# Patient Record
Sex: MALE | Race: BLACK OR AFRICAN AMERICAN | URBAN - METROPOLITAN AREA
[De-identification: names, ages, dates, MRNs, and addresses within clinical notes are randomized per-mention and may not be internally consistent; named-entity substitution may affect disease eponyms.]

---

## 2019-01-01 ENCOUNTER — TELEPHONE (OUTPATIENT)
Dept: FAMILY MEDICINE CLINIC | Age: 49
End: 2019-01-01

## 2019-01-01 ENCOUNTER — OFFICE VISIT (OUTPATIENT)
Dept: FAMILY MEDICINE CLINIC | Age: 49
End: 2019-01-01

## 2019-01-01 ENCOUNTER — OFFICE VISIT (OUTPATIENT)
Dept: CARDIOLOGY CLINIC | Age: 49
End: 2019-01-01

## 2019-01-01 VITALS
HEIGHT: 69 IN | TEMPERATURE: 98.3 F | SYSTOLIC BLOOD PRESSURE: 160 MMHG | WEIGHT: 301 LBS | OXYGEN SATURATION: 96 % | DIASTOLIC BLOOD PRESSURE: 76 MMHG | RESPIRATION RATE: 16 BRPM | BODY MASS INDEX: 44.58 KG/M2 | HEART RATE: 88 BPM

## 2019-01-01 VITALS
DIASTOLIC BLOOD PRESSURE: 70 MMHG | HEIGHT: 69 IN | SYSTOLIC BLOOD PRESSURE: 140 MMHG | BODY MASS INDEX: 43.69 KG/M2 | OXYGEN SATURATION: 98 % | HEART RATE: 58 BPM | WEIGHT: 295 LBS

## 2019-01-01 VITALS
RESPIRATION RATE: 16 BRPM | HEIGHT: 69 IN | SYSTOLIC BLOOD PRESSURE: 149 MMHG | DIASTOLIC BLOOD PRESSURE: 68 MMHG | HEART RATE: 63 BPM | BODY MASS INDEX: 43.84 KG/M2 | OXYGEN SATURATION: 97 % | TEMPERATURE: 98.1 F | WEIGHT: 296 LBS

## 2019-01-01 VITALS
TEMPERATURE: 98.4 F | DIASTOLIC BLOOD PRESSURE: 76 MMHG | HEIGHT: 69 IN | SYSTOLIC BLOOD PRESSURE: 173 MMHG | WEIGHT: 293 LBS | OXYGEN SATURATION: 98 % | HEART RATE: 74 BPM | BODY MASS INDEX: 43.4 KG/M2 | RESPIRATION RATE: 17 BRPM

## 2019-01-01 DIAGNOSIS — Z79.4 CONTROLLED TYPE 2 DIABETES MELLITUS WITH HYPERGLYCEMIA, WITH LONG-TERM CURRENT USE OF INSULIN (HCC): ICD-10-CM

## 2019-01-01 DIAGNOSIS — Z79.4 TYPE 2 DIABETES MELLITUS WITH HYPERGLYCEMIA, WITH LONG-TERM CURRENT USE OF INSULIN (HCC): Primary | ICD-10-CM

## 2019-01-01 DIAGNOSIS — E66.01 MORBID OBESITY WITH BMI OF 40.0-44.9, ADULT (HCC): ICD-10-CM

## 2019-01-01 DIAGNOSIS — H25.13 NUCLEAR SCLEROTIC CATARACT OF BOTH EYES: ICD-10-CM

## 2019-01-01 DIAGNOSIS — E11.65 TYPE 2 DIABETES MELLITUS WITH HYPERGLYCEMIA, WITH LONG-TERM CURRENT USE OF INSULIN (HCC): ICD-10-CM

## 2019-01-01 DIAGNOSIS — R06.02 SOB (SHORTNESS OF BREATH) ON EXERTION: ICD-10-CM

## 2019-01-01 DIAGNOSIS — Z79.4 TYPE 2 DIABETES MELLITUS WITH HYPERGLYCEMIA, WITH LONG-TERM CURRENT USE OF INSULIN (HCC): ICD-10-CM

## 2019-01-01 DIAGNOSIS — I50.9 CONGESTIVE HEART FAILURE, UNSPECIFIED HF CHRONICITY, UNSPECIFIED HEART FAILURE TYPE (HCC): Primary | ICD-10-CM

## 2019-01-01 DIAGNOSIS — R94.31 ABNORMAL EKG: ICD-10-CM

## 2019-01-01 DIAGNOSIS — H31.001 CHORIORETINAL SCAR OF RIGHT EYE: ICD-10-CM

## 2019-01-01 DIAGNOSIS — E11.65 CONTROLLED TYPE 2 DIABETES MELLITUS WITH HYPERGLYCEMIA, WITH LONG-TERM CURRENT USE OF INSULIN (HCC): ICD-10-CM

## 2019-01-01 DIAGNOSIS — R07.9 CHEST PAIN, UNSPECIFIED TYPE: ICD-10-CM

## 2019-01-01 DIAGNOSIS — M79.89 LEG SWELLING: ICD-10-CM

## 2019-01-01 DIAGNOSIS — Z00.00 INITIAL MEDICARE ANNUAL WELLNESS VISIT: ICD-10-CM

## 2019-01-01 DIAGNOSIS — E11.65 TYPE 2 DIABETES MELLITUS WITH HYPERGLYCEMIA, WITH LONG-TERM CURRENT USE OF INSULIN (HCC): Primary | ICD-10-CM

## 2019-01-01 DIAGNOSIS — R55 SYNCOPE, UNSPECIFIED SYNCOPE TYPE: Primary | ICD-10-CM

## 2019-01-01 DIAGNOSIS — R06.02 SOB (SHORTNESS OF BREATH): ICD-10-CM

## 2019-01-01 DIAGNOSIS — N18.2 CKD (CHRONIC KIDNEY DISEASE) STAGE 2, GFR 60-89 ML/MIN: ICD-10-CM

## 2019-01-01 DIAGNOSIS — I10 ESSENTIAL HYPERTENSION: ICD-10-CM

## 2019-01-01 DIAGNOSIS — G47.33 OSA (OBSTRUCTIVE SLEEP APNEA): ICD-10-CM

## 2019-01-01 DIAGNOSIS — I10 ESSENTIAL HYPERTENSION: Primary | ICD-10-CM

## 2019-01-01 LAB
ALBUMIN SERPL-MCNC: 3.4 G/DL (ref 3.5–5.5)
ALBUMIN/CREAT UR: 3496.3 MG/G CREAT (ref 0–30)
ALBUMIN/GLOB SERPL: 1.1 {RATIO} (ref 1.2–2.2)
ALP SERPL-CCNC: 112 IU/L (ref 39–117)
ALT SERPL-CCNC: 19 IU/L (ref 0–44)
AST SERPL-CCNC: 21 IU/L (ref 0–40)
BASOPHILS # BLD AUTO: 0 X10E3/UL (ref 0–0.2)
BASOPHILS NFR BLD AUTO: 0 %
BILIRUB SERPL-MCNC: <0.2 MG/DL (ref 0–1.2)
BUN SERPL-MCNC: 44 MG/DL (ref 6–24)
BUN SERPL-MCNC: 54 MG/DL (ref 6–24)
BUN/CREAT SERPL: 22 (ref 9–20)
BUN/CREAT SERPL: 27 (ref 9–20)
CALCIUM SERPL-MCNC: 8 MG/DL (ref 8.7–10.2)
CALCIUM SERPL-MCNC: 9 MG/DL (ref 8.7–10.2)
CHLORIDE SERPL-SCNC: 106 MMOL/L (ref 96–106)
CHLORIDE SERPL-SCNC: 109 MMOL/L (ref 96–106)
CHOLEST SERPL-MCNC: 204 MG/DL (ref 100–199)
CO2 SERPL-SCNC: 21 MMOL/L (ref 20–29)
CO2 SERPL-SCNC: 22 MMOL/L (ref 20–29)
CREAT SERPL-MCNC: 1.98 MG/DL (ref 0.76–1.27)
CREAT SERPL-MCNC: 2 MG/DL (ref 0.76–1.27)
CREAT UR-MCNC: 56.7 MG/DL
EOSINOPHIL # BLD AUTO: 0.1 X10E3/UL (ref 0–0.4)
EOSINOPHIL NFR BLD AUTO: 1 %
ERYTHROCYTE [DISTWIDTH] IN BLOOD BY AUTOMATED COUNT: 17 % (ref 12.3–15.4)
EST. AVERAGE GLUCOSE BLD GHB EST-MCNC: 289 MG/DL
GLOBULIN SER CALC-MCNC: 3 G/DL (ref 1.5–4.5)
GLUCOSE SERPL-MCNC: 146 MG/DL (ref 65–99)
GLUCOSE SERPL-MCNC: 158 MG/DL (ref 65–99)
HBA1C MFR BLD: 11.7 % (ref 4.8–5.6)
HCT VFR BLD AUTO: 42.3 % (ref 37.5–51)
HDLC SERPL-MCNC: 36 MG/DL
HGB BLD-MCNC: 13.4 G/DL (ref 13–17.7)
IMM GRANULOCYTES # BLD AUTO: 0 X10E3/UL (ref 0–0.1)
IMM GRANULOCYTES NFR BLD AUTO: 0 %
LDLC SERPL CALC-MCNC: 116 MG/DL (ref 0–99)
LYMPHOCYTES # BLD AUTO: 3 X10E3/UL (ref 0.7–3.1)
LYMPHOCYTES NFR BLD AUTO: 37 %
MCH RBC QN AUTO: 26.7 PG (ref 26.6–33)
MCHC RBC AUTO-ENTMCNC: 31.7 G/DL (ref 31.5–35.7)
MCV RBC AUTO: 84 FL (ref 79–97)
MICROALBUMIN UR-MCNC: 1982.4 UG/ML
MONOCYTES # BLD AUTO: 0.4 X10E3/UL (ref 0.1–0.9)
MONOCYTES NFR BLD AUTO: 5 %
NEUTROPHILS # BLD AUTO: 4.7 X10E3/UL (ref 1.4–7)
NEUTROPHILS NFR BLD AUTO: 57 %
PLATELET # BLD AUTO: 207 X10E3/UL (ref 150–450)
POTASSIUM SERPL-SCNC: 5.5 MMOL/L (ref 3.5–5.2)
POTASSIUM SERPL-SCNC: 5.7 MMOL/L (ref 3.5–5.2)
PROT SERPL-MCNC: 6.4 G/DL (ref 6–8.5)
RBC # BLD AUTO: 5.01 X10E6/UL (ref 4.14–5.8)
SODIUM SERPL-SCNC: 140 MMOL/L (ref 134–144)
SODIUM SERPL-SCNC: 142 MMOL/L (ref 134–144)
TRIGL SERPL-MCNC: 259 MG/DL (ref 0–149)
VLDLC SERPL CALC-MCNC: 52 MG/DL (ref 5–40)
WBC # BLD AUTO: 8.2 X10E3/UL (ref 3.4–10.8)

## 2019-01-01 RX ORDER — CARVEDILOL 12.5 MG/1
TABLET ORAL 2 TIMES DAILY WITH MEALS
COMMUNITY
End: 2019-01-01 | Stop reason: SDUPTHER

## 2019-01-01 RX ORDER — ATORVASTATIN CALCIUM 40 MG/1
40 TABLET, FILM COATED ORAL DAILY
Qty: 30 TAB | Refills: 2 | Status: SHIPPED | OUTPATIENT
Start: 2019-01-01 | End: 2019-01-01 | Stop reason: SDUPTHER

## 2019-01-01 RX ORDER — INSULIN GLARGINE 100 [IU]/ML
22 INJECTION, SOLUTION SUBCUTANEOUS
Qty: 15 ML | Refills: 3 | Status: SHIPPED | OUTPATIENT
Start: 2019-01-01

## 2019-01-01 RX ORDER — LISINOPRIL 10 MG/1
TABLET ORAL DAILY
COMMUNITY
End: 2019-01-01 | Stop reason: SDUPTHER

## 2019-01-01 RX ORDER — INSULIN GLARGINE 100 [IU]/ML
22 INJECTION, SOLUTION SUBCUTANEOUS
Qty: 15 ML | Refills: 3 | Status: SHIPPED | OUTPATIENT
Start: 2019-01-01 | End: 2019-01-01 | Stop reason: SDUPTHER

## 2019-01-01 RX ORDER — ROSUVASTATIN CALCIUM 40 MG/1
40 TABLET, COATED ORAL
COMMUNITY
End: 2019-01-01

## 2019-01-01 RX ORDER — INSULIN GLARGINE 100 [IU]/ML
10 INJECTION, SOLUTION SUBCUTANEOUS
COMMUNITY
End: 2019-01-01 | Stop reason: SDUPTHER

## 2019-01-01 RX ORDER — ATORVASTATIN CALCIUM 40 MG/1
TABLET, FILM COATED ORAL DAILY
COMMUNITY
End: 2019-01-01 | Stop reason: SDUPTHER

## 2019-01-01 RX ORDER — ATORVASTATIN CALCIUM 80 MG/1
80 TABLET, FILM COATED ORAL
Qty: 30 TAB | Refills: 2 | Status: SHIPPED | OUTPATIENT
Start: 2019-01-01

## 2019-01-01 RX ORDER — INSULIN LISPRO 100 [IU]/ML
INJECTION, SOLUTION INTRAVENOUS; SUBCUTANEOUS
Qty: 1 PACKAGE | Refills: 2 | Status: SHIPPED | OUTPATIENT
Start: 2019-01-01 | End: 2019-01-01 | Stop reason: SDUPTHER

## 2019-01-01 RX ORDER — AMLODIPINE BESYLATE 10 MG/1
10 TABLET ORAL DAILY
Qty: 30 TAB | Refills: 2 | Status: SHIPPED | OUTPATIENT
Start: 2019-01-01 | End: 2019-01-01 | Stop reason: SDUPTHER

## 2019-01-01 RX ORDER — CARVEDILOL 12.5 MG/1
12.5 TABLET ORAL 2 TIMES DAILY WITH MEALS
Qty: 60 TAB | Refills: 2 | Status: SHIPPED | OUTPATIENT
Start: 2019-01-01 | End: 2019-01-01 | Stop reason: SDUPTHER

## 2019-01-01 RX ORDER — INSULIN LISPRO 100 [IU]/ML
10 INJECTION, SOLUTION INTRAVENOUS; SUBCUTANEOUS
COMMUNITY
End: 2019-01-01 | Stop reason: SDUPTHER

## 2019-01-01 RX ORDER — LISINOPRIL 10 MG/1
10 TABLET ORAL DAILY
Qty: 30 TAB | Refills: 2 | Status: SHIPPED | OUTPATIENT
Start: 2019-01-01

## 2019-01-01 RX ORDER — LISINOPRIL 10 MG/1
10 TABLET ORAL DAILY
Qty: 30 TAB | Refills: 2 | Status: SHIPPED | OUTPATIENT
Start: 2019-01-01 | End: 2019-01-01 | Stop reason: SDUPTHER

## 2019-01-01 RX ORDER — INSULIN LISPRO 100 [IU]/ML
INJECTION, SOLUTION INTRAVENOUS; SUBCUTANEOUS
Qty: 45 ML | Refills: 2 | Status: SHIPPED | OUTPATIENT
Start: 2019-01-01

## 2019-01-01 RX ORDER — FUROSEMIDE 80 MG/1
80 TABLET ORAL DAILY
Qty: 30 TAB | Refills: 2 | Status: SHIPPED | OUTPATIENT
Start: 2019-01-01

## 2019-01-01 RX ORDER — AMLODIPINE BESYLATE 10 MG/1
10 TABLET ORAL DAILY
Qty: 30 TAB | Refills: 2 | Status: SHIPPED | OUTPATIENT
Start: 2019-01-01

## 2019-01-01 RX ORDER — CARVEDILOL 12.5 MG/1
12.5 TABLET ORAL 2 TIMES DAILY WITH MEALS
Qty: 60 TAB | Refills: 2 | Status: SHIPPED | OUTPATIENT
Start: 2019-01-01

## 2019-01-01 RX ORDER — INSULIN LISPRO 100 [IU]/ML
15 INJECTION, SOLUTION INTRAVENOUS; SUBCUTANEOUS
Qty: 1 PACKAGE | Refills: 2 | Status: SHIPPED | OUTPATIENT
Start: 2019-01-01 | End: 2019-01-01 | Stop reason: SDUPTHER

## 2019-01-01 RX ORDER — AMLODIPINE BESYLATE 10 MG/1
TABLET ORAL DAILY
COMMUNITY
End: 2019-01-01 | Stop reason: SDUPTHER

## 2019-01-01 RX ORDER — FUROSEMIDE 80 MG/1
TABLET ORAL 3 TIMES DAILY
COMMUNITY
End: 2019-01-01 | Stop reason: SDUPTHER

## 2019-07-23 NOTE — PATIENT INSTRUCTIONS
Diabetes Foot Health: Care Instructions  Your Care Instructions    When you have diabetes, your feet need extra care and attention. Diabetes can damage the nerve endings and blood vessels in your feet, making you less likely to notice when your feet are injured. Diabetes also limits your body's ability to fight infection and get blood to areas that need it. If you get a minor foot injury, it could become an ulcer or a serious infection. With good foot care, you can prevent most of these problems. Caring for your feet can be quick and easy. Most of the care can be done when you are bathing or getting ready for bed. Follow-up care is a key part of your treatment and safety. Be sure to make and go to all appointments, and call your doctor if you are having problems. It's also a good idea to know your test results and keep a list of the medicines you take. How can you care for yourself at home? · Keep your blood sugar close to normal by watching what and how much you eat, monitoring blood sugar, taking medicines if prescribed, and getting regular exercise. · Do not smoke. Smoking affects blood flow and can make foot problems worse. If you need help quitting, talk to your doctor about stop-smoking programs and medicines. These can increase your chances of quitting for good. · Eat a diet that is low in fats. High fat intake can cause fat to build up in your blood vessels and decrease blood flow. · Inspect your feet daily for blisters, cuts, cracks, or sores. If you cannot see well, use a mirror or have someone help you. · Take care of your feet:  ? Wash your feet every day. Use warm (not hot) water. Check the water temperature with your wrists or other part of your body, not your feet. ? Dry your feet well. Pat them dry. Do not rub the skin on your feet too hard. Dry well between your toes. If the skin on your feet stays moist, bacteria or a fungus can grow, which can lead to infection. ?  Keep your skin soft. Use moisturizing skin cream to keep the skin on your feet soft and prevent calluses and cracks. But do not put the cream between your toes, and stop using any cream that causes a rash. ? Clean underneath your toenails carefully. Do not use a sharp object to clean underneath your toenails. Use the blunt end of a nail file or other rounded tool. ? Trim and file your toenails straight across to prevent ingrown toenails. Use a nail clipper, not scissors. Use an emery board to smooth the edges. · Change socks daily. Socks without seams are best, because seams often rub the feet. You can find socks for people with diabetes from specialty catalogs. · Look inside your shoes every day for things like gravel or torn linings, which could cause blisters or sores. · Buy shoes that fit well:  ? Look for shoes that have plenty of space around the toes. This helps prevent bunions and blisters. ? Try on shoes while wearing the kind of socks you will usually wear with the shoes. ? Avoid plastic shoes. They may rub your feet and cause blisters. Good shoes should be made of materials that are flexible and breathable, such as leather or cloth. ? Break in new shoes slowly by wearing them for no more than an hour a day for several days. Take extra time to check your feet for red areas, blisters, or other problems after you wear new shoes. · Do not go barefoot. Do not wear sandals, and do not wear shoes with very thin soles. Thin soles are easy to puncture. They also do not protect your feet from hot pavement or cold weather. · Have your doctor check your feet during each visit. If you have a foot problem, see your doctor. Do not try to treat an early foot problem at home. Home remedies or treatments that you can buy without a prescription (such as corn removers) can be harmful. · Always get early treatment for foot problems. A minor irritation can lead to a major problem if not properly cared for early.   When should you call for help? Call your doctor now or seek immediate medical care if:    · You have a foot sore, an ulcer or break in the skin that is not healing after 4 days, bleeding corns or calluses, or an ingrown toenail.     · You have blue or black areas, which can mean bruising or blood flow problems.     · You have peeling skin or tiny blisters between your toes or cracking or oozing of the skin.     · You have a fever for more than 24 hours and a foot sore.     · You have new numbness or tingling in your feet that does not go away after you move your feet or change positions.     · You have unexplained or unusual swelling of the foot or ankle.    Watch closely for changes in your health, and be sure to contact your doctor if:    · You cannot do proper foot care. Where can you learn more? Go to http://balwinder-viraj.info/. Enter A739 in the search box to learn more about \"Diabetes Foot Health: Care Instructions. \"  Current as of: July 25, 2018  Content Version: 12.1  © 4468-8134 [employer]. Care instructions adapted under license by [employer] (which disclaims liability or warranty for this information). If you have questions about a medical condition or this instruction, always ask your healthcare professional. [address] any warranty or liability for your use of this information.

## 2019-07-23 NOTE — PROGRESS NOTES
Pt is here for establish care  Diabetic, HTN    1. Have you been to the ER, urgent care clinic since your last visit? Hospitalized since your last visit? No    2. Have you seen or consulted any other health care providers outside of the [address] [place] [address] since your last visit? Include any pap smears or colon screening.  No

## 2019-07-29 PROBLEM — R91.8 LUNG MASS: Status: ACTIVE | Noted: 2017-09-05

## 2019-07-29 PROBLEM — B40.2 PULMONARY BLASTOMYCOSIS (HCC): Status: ACTIVE | Noted: 2017-09-22

## 2019-07-29 PROBLEM — G47.33 OSA (OBSTRUCTIVE SLEEP APNEA): Status: ACTIVE | Noted: 2017-08-09

## 2019-07-29 PROBLEM — M79.89 LEG SWELLING: Status: ACTIVE | Noted: 2017-10-11

## 2019-07-29 PROBLEM — Z86.19 HX OF HERPES GENITALIS: Status: ACTIVE | Noted: 2019-01-01

## 2019-07-29 PROBLEM — E66.01 MORBID OBESITY (HCC): Status: ACTIVE | Noted: 2017-08-09

## 2019-08-05 NOTE — TELEPHONE ENCOUNTER
Left message to call back office. His prescription is printed and can be picked up at the .  If he wants it sent to a pharmacy we need one on file

## 2019-08-20 NOTE — PROGRESS NOTES
HISTORY OF PRESENT ILLNESS  Catarina Larose is a 54 y.o. male. HPI   Pt is following-up for DM/HTN. Pt states that he had a hypoglycemic episode where EMT needed to come out yesterday morning. Pt states his mom found him unresponsive in bed drooling. Pt states he think his blood sugar was in the 20's. Mr. Estela Her states he took his evening dose of Humalog of 10 units with fish and salad. Pt states he did not go in for treatment because he felt better but tired. Mr. Estela Her is checking his BS 4 times daily. Fasting BS- 150's-240's, postprandial- 300's-400's. Pt states that he has run out of both Humalog and Lantus. Diet consist of chicken and fish that is fried. He did state that he stopped fried food just last week. Pt states that his left side hurts under his rib cage. He denies falling but states that his mother did find him hanging off the bed. Pain is present when he takes a deep breath, sneezes, lays a certain way, or puts pressure in the area. Pt has not tried any remedies for pain. Pt would also like to discuss erectile dysfunction. He is having problems getting a full erection and keeping a full erection on most sexual encounters. Pt is also asking for referral to gastric sleeve. Review of Systems   Constitutional: Negative for chills and fever. Eyes: Negative. Negative for blurred vision. Respiratory: Negative for cough and shortness of breath. Cardiovascular: Positive for leg swelling. Negative for chest pain and palpitations. Gastrointestinal: Negative for abdominal pain and vomiting. Skin: Negative. Neurological: Negative for dizziness, tingling, weakness and headaches. Physical Exam   Constitutional: He is oriented to person, place, and time. He appears well-developed and well-nourished. HENT:   Head: Normocephalic and atraumatic. Neck: Normal range of motion. Neck supple. Cardiovascular: Normal rate, regular rhythm and normal heart sounds.  Exam reveals no gallop and no friction rub. No murmur heard. Pulmonary/Chest: Effort normal and breath sounds normal. No respiratory distress. He exhibits tenderness. Abdominal: Soft. Bowel sounds are normal.   Musculoskeletal: Normal range of motion. He exhibits edema. Neurological: He is alert and oriented to person, place, and time. He has normal reflexes. Skin: Skin is warm and dry. Psychiatric: He has a normal mood and affect. Nursing note and vitals reviewed.

## 2019-08-20 NOTE — TELEPHONE ENCOUNTER
At checkout patient wanted to know why was his body hard. He said he is told that he is rock hard and he though that was normal for a deb.      pls advise

## 2019-08-20 NOTE — PROGRESS NOTES
Subjective:    Volodymyr Mari is a 54y.o. year old male seen for follow up of diabetes. He also has hypertension and hyperlipidemia. Diabetic Review of Systems - medication compliance: compliant all of the time, diabetic diet compliance: noncompliant some of the time, home glucose monitoring: is performed regularly, further diabetic ROS: no polyuria or polydipsia, no chest pain, dyspnea or TIA's, no numbness, tingling or pain in extremities. Discussed recent lab results in detail. Patient states he did not receive message. Other symptoms and concerns: patient states he had a syncopal episode yesterday. Reports his mother found him slumped over in the bed. Comments mother called [phone] and per patient he was given glucose injection. Patient states he thinks EMT reported his glucose was 20. States after glucose injection he felt better and didn't want to go to the ED for evaluation. Reports this was his 2nd episode in 2 weeks. Admits to take humalog without regard to blood glucose levels. Patient reports he has had left side pain since yesterday. Unsure if this is related to him being slumped over the bed. 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  insulin glargine (LANTUS SOLOSTAR U-100 INSULIN) 100 unit/mL (3 mL) inpn 22 Units by SubCUTAneous route nightly. 15 mL 3    insulin lispro (HUMALOG KWIKPEN INSULIN) 100 unit/mL kwikpen 15 Units by SubCUTAneous route Before breakfast, lunch, and dinner.  1 Package 2  amLODIPine (NORVASC) 10 mg tablet Take 1 Tab by mouth daily. 30 Tab 2    carvedilol (COREG) 12.5 mg tablet Take 1 Tab by mouth two (2) times daily (with meals). 60 Tab 2    lisinopril (PRINIVIL, ZESTRIL) 10 mg tablet Take 1 Tab by mouth daily. 30 Tab 2    atorvastatin (LIPITOR) 40 mg tablet Take 1 Tab by mouth daily. 30 Tab 2    furosemide (LASIX) 80 mg tablet Take  by mouth three (3) times daily.  glucagon (GLUCAGEN) 1 mg injection 0.5 mg by IntraVENous route once. Allergies   Allergen Reactions    Penicillins Unknown (comments)     Had reaction as  a baby     Past Surgical History:   Procedure Laterality Date    HX ORTHOPAEDIC      L wrist surgery     Family History   Problem Relation Age of Onset    COPD Mother     Arthritis Mother     Hypertension Mother     Diabetes Father       Lab Results   Component Value Date/Time    Cholesterol, total 204 (H) 07/23/2019 11:38 AM    HDL Cholesterol 36 (L) 07/23/2019 11:38 AM    LDL, calculated 116 (H) 07/23/2019 11:38 AM    VLDL, calculated 52 (H) 07/23/2019 11:38 AM    Triglyceride 259 (H) 07/23/2019 11:38 AM     Lab Results   Component Value Date/Time    Sodium 142 07/23/2019 11:38 AM    Potassium 5.7 (H) 07/23/2019 11:38 AM    Chloride 109 (H) 07/23/2019 11:38 AM    CO2 21 07/23/2019 11:38 AM    Glucose 158 (H) 07/23/2019 11:38 AM    BUN 44 (H) 07/23/2019 11:38 AM    Creatinine 1.98 (H) 07/23/2019 11:38 AM    BUN/Creatinine ratio 22 (H) 07/23/2019 11:38 AM    GFR est AA 45 (L) 07/23/2019 11:38 AM    GFR est non-AA 39 (L) 07/23/2019 11:38 AM    Calcium 9.0 07/23/2019 11:38 AM    Bilirubin, total <0.2 07/23/2019 11:38 AM    AST (SGOT) 21 07/23/2019 11:38 AM    Alk.  phosphatase 112 07/23/2019 11:38 AM    Protein, total 6.4 07/23/2019 11:38 AM    Albumin 3.4 (L) 07/23/2019 11:38 AM    A-G Ratio 1.1 (L) 07/23/2019 11:38 AM    ALT (SGPT) 19 07/23/2019 11:38 AM     Lab Results   Component Value Date/Time    WBC 8.2 07/23/2019 11:38 AM    HGB 13.4 07/23/2019 11:38 AM    HCT 42.3 07/23/2019 11:38 AM    PLATELET 093 37/31/0039 11:38 AM    MCV 84 07/23/2019 11:38 AM     Lab Results   Component Value Date/Time    Hemoglobin A1c 11.7 (H) 07/23/2019 11:38 AM     Lab Results   Component Value Date/Time    Microalb/Creat ratio (ug/mg creat.) 3,496.3 (H) 07/23/2019 11:38 AM     Wt Readings from Last 3 Encounters:   08/20/19 296 lb (134.3 kg)   07/23/19 301 lb (136.5 kg)     Last Point of Care HGB A1C  No results found for: [id number]   BP Readings from Last 3 Encounters:   08/20/19 149/68   07/23/19 160/76       Results for orders placed or performed in visit on 07/23/19   CBC WITH AUTOMATED DIFF   Result Value Ref Range    WBC 8.2 3.4 - 10.8 x10E3/uL    RBC 5.01 4.14 - 5.80 x10E6/uL    HGB 13.4 13.0 - 17.7 g/dL    HCT 42.3 37.5 - 51.0 %    MCV 84 79 - 97 fL    MCH 26.7 26.6 - 33.0 pg    MCHC 31.7 31.5 - 35.7 g/dL    RDW 17.0 (H) 12.3 - 15.4 %    PLATELET 980 273 - 338 x10E3/uL    NEUTROPHILS 57 Not Estab. %    Lymphocytes 37 Not Estab. %    MONOCYTES 5 Not Estab. %    EOSINOPHILS 1 Not Estab. %    BASOPHILS 0 Not Estab. %    ABS. NEUTROPHILS 4.7 1.4 - 7.0 x10E3/uL    Abs Lymphocytes 3.0 0.7 - 3.1 x10E3/uL    ABS. MONOCYTES 0.4 0.1 - 0.9 x10E3/uL    ABS. EOSINOPHILS 0.1 0.0 - 0.4 x10E3/uL    ABS. BASOPHILS 0.0 0.0 - 0.2 x10E3/uL    IMMATURE GRANULOCYTES 0 Not Estab. %    ABS. IMM.  GRANS. 0.0 0.0 - 0.1 M40K8/LA   METABOLIC PANEL, COMPREHENSIVE   Result Value Ref Range    Glucose 158 (H) 65 - 99 mg/dL    BUN 44 (H) 6 - 24 mg/dL    Creatinine 1.98 (H) 0.76 - 1.27 mg/dL    GFR est non-AA 39 (L) >59 mL/min/1.73    GFR est AA 45 (L) >59 mL/min/1.73    BUN/Creatinine ratio 22 (H) 9 - 20    Sodium 142 134 - 144 mmol/L    Potassium 5.7 (H) 3.5 - 5.2 mmol/L    Chloride 109 (H) 96 - 106 mmol/L    CO2 21 20 - 29 mmol/L    Calcium 9.0 8.7 - 10.2 mg/dL    Protein, total 6.4 6.0 - 8.5 g/dL    Albumin 3.4 (L) 3.5 - 5.5 g/dL    GLOBULIN, TOTAL 3.0 1.5 - 4.5 g/dL    A-G Ratio 1.1 (L) 1.2 - 2.2    Bilirubin, total <0.2 0.0 - 1.2 mg/dL    Alk. phosphatase 112 39 - 117 IU/L    AST (SGOT) 21 0 - 40 IU/L    ALT (SGPT) 19 0 - 44 IU/L   LIPID PANEL   Result Value Ref Range    Cholesterol, total 204 (H) 100 - 199 mg/dL    Triglyceride 259 (H) 0 - 149 mg/dL    HDL Cholesterol 36 (L) >39 mg/dL    VLDL, calculated 52 (H) 5 - 40 mg/dL    LDL, calculated 116 (H) 0 - 99 mg/dL   MICROALBUMIN, UR, RAND   Result Value Ref Range    Creatinine, urine 56.7 Not Estab. mg/dL    Microalbumin, urine 1,982.4 Not Estab. ug/mL    Microalb/Creat ratio (ug/mg creat.) 3,496.3 (H) 0.0 - 30.0 mg/g creat   HEMOGLOBIN A1C WITH EAG   Result Value Ref Range    Hemoglobin A1c 11.7 (H) 4.8 - 5.6 %    Estimated average glucose 289 mg/dL         Last Diabetic Foot Exam on: 7/23/19        Objective:  Visit Vitals  /68 (BP 1 Location: Left arm, BP Patient Position: Sitting)   Pulse 63   Temp 98.1 °F (36.7 °C) (Oral)   Resp 16   Ht 5' 9\" (1.753 m)   Wt 296 lb (134.3 kg)   SpO2 97%   BMI 43.71 kg/m²     Awake and alert in no acute distress   Neck supple without lymphadenopathy, no carotid artery bruits auscultated bilaterally. No thyromegaly   Lungs clear throughout   S1 S2 RRR without ectopy or murmur auscultated. Extremities: no clubbing, cyanosis, peripheral edema   Abdomen - soft, nontender, nondistended, no masses or organomegaly  Integumentary: no rashes   Reviewed vital signs           Assessment/Plan:    ICD-10-CM ICD-9-CM    1. Syncope, unspecified syncope type R55 780.2 AMB POC EKG ROUTINE W/ 12 LEADS, INTER & REP      METABOLIC PANEL, BASIC      REFERRAL TO CARDIOLOGY      METABOLIC PANEL, BASIC   2. Essential hypertension I10 401.9    3. Type 2 diabetes mellitus with hyperglycemia, with long-term current use of insulin (HCC) E11.65 250.00     Z79.4 790.29      V58.67    4. Morbid obesity with BMI of 40.0-44.9, adult ([facility] E66.01 278.01 REFERRAL TO GENERAL SURGERY    Z68.41 V85.41    5.  CKD (chronic kidney disease) stage 2, GFR 60-89 ml/min N18.2 585.2    6. Abnormal EKG R94.31 794.31 REFERRAL TO CARDIOLOGY     Orders Placed This Encounter    METABOLIC PANEL, BASIC    REFERRAL TO GENERAL SURGERY    Domenic Moss ref [facility]    AMB POC EKG ROUTINE W/ 12 LEADS, INTER & REP    OTHER    OTHER    DISCONTD: exenatide microspheres (BYDUREON) 2 mg serr    insulin glargine (LANTUS SOLOSTAR U-100 INSULIN) 100 unit/mL (3 mL) inpn    DISCONTD: insulin lispro (HUMALOG KWIKPEN INSULIN) 100 unit/mL kwikpen    furosemide (LASIX) 80 mg tablet    atorvastatin (LIPITOR) 80 mg tablet    lisinopril (PRINIVIL, ZESTRIL) 10 mg tablet    carvedilol (COREG) 12.5 mg tablet    amLODIPine (NORVASC) 10 mg tablet    exenatide microspheres (BYDUREON) 2 mg serr       Advised will need to ensure blood pressure is controlled and also have seen cardiology prior to having medication prescribed for ED. Sliding scale for humalog provided and reviewed  I have discussed the diagnosis with the patient and the intended plan as seen in the above orders. The patient has received an after-visit summary and questions were answered concerning future plans. I have discussed medication side effects and warnings with the patient as well. Patient agreeable with above plan and verbalizes understanding. Follow-up and Dispositions    · Return in about 2 weeks (around 9/3/2019) for DM since medication adjustment.

## 2019-08-20 NOTE — PROGRESS NOTES
Ivet Prabhakar presents today for   Chief Complaint   Patient presents with    Diabetes    Hypertension     ** Per patient yesterday 8/19/19 his mother found him bent over in the bed unresponsive and drooling. His mother called 46 and he stated that once the paramedics got his blood sugar under control he became responsive and refused to go to the ED    Is someone accompanying this pt? no    Is the patient using any DME equipment during OV? no    Depression Screening:  3 most recent PHQ Screens 7/23/2019   Little interest or pleasure in doing things Not at all   Feeling down, depressed, irritable, or hopeless Not at all   Total Score PHQ 2 0       Learning Assessment:  Learning Assessment 7/23/2019   PRIMARY LEARNER Patient   HIGHEST LEVEL OF EDUCATION - PRIMARY LEARNER  GRADUATED HIGH SCHOOL OR GED   BARRIERS PRIMARY LEARNER NONE   CO-LEARNER CAREGIVER No   PRIMARY LANGUAGE ENGLISH   LEARNER PREFERENCE PRIMARY DEMONSTRATION   ANSWERED BY self   RELATIONSHIP SELF       Abuse Screening:  No flowsheet data found. Fall Risk  No flowsheet data found. Health Maintenance reviewed and discussed and ordered per Provider. Health Maintenance Due   Topic Date Due    EYE EXAM RETINAL OR DILATED  02/19/1982    Influenza Age 5 to Adult  08/01/2019    MEDICARE YEARLY EXAM  07/23/2019           Coordination of Care:  1. Have you been to the ER, urgent care clinic since your last visit? Hospitalized since your last visit? no    2. Have you seen or consulted any other health care providers outside of the [address] [place] [address] since your last visit? Include any pap smears or colon screening.  no

## 2019-08-28 NOTE — TELEPHONE ENCOUNTER
Patient is asking for his blood work results to be sent to his kidney specialists.  Patient stated they could not schedule his appointment until they receive his blood work

## 2019-09-04 NOTE — PROGRESS NOTES
Subjective:    Yi Saini is a 54y.o. year old male seen for follow up of diabetes since medication adjustment. He has been taking glucose routinely. States the highest he has had to go on the sliding scale in 10 units. 218-7 day  198-14 day  212-28 day    Other symptoms and concerns: patient states his energy has been decreased and also has been having sob with exertion for the last week. Reports he did just realized he has not used his CPAP in the last 2 weeks due to needing replacement hose and mask, which he has ordered. Reports his swelling has improved since his last visit. Comments today when he walked up the stairs he did have some sob with exertion but resolved after 1 min or so. Admits to sob has increased slightly over the last week. Denies orthopnea  Has upcoming appt with cardiology. Patient Active Problem List   Diagnosis Code    Essential hypertension I10    Hx of herpes genitalis Z86.19    Lung mass R91.8    Leg swelling M79.89    Morbid obesity ([address] E66.01    NATHALIA (obstructive sleep apnea) G47.33    Pulmonary blastomycosis ([facility] B40.2    Vasculogenic erectile dysfunction N52.9     Current Outpatient Medications   Medication Sig Dispense Refill    flash glucose scanning reader (FREESTYLE MERI 14 DAY READER) misc To use with meri sensor. Check glucose 3 times a day 1 Each 1    flash glucose sensor (FREESTYLE MERI 14 DAY SENSOR) kit Check glucose 3 times a day 1 Kit 5    insulin lispro (HUMALOG KWIKPEN INSULIN) 100 unit/mL kwikpen INJECT BENEATH THE SKIN. USE PER SLIDING SCALE ATTACHED TO CONTAINER. SCALE INDICATES 0-28 UNITS. 45 mL 2    OTHER Indications: black seed oil      OTHER Indications: colon cleanse      insulin glargine (LANTUS SOLOSTAR U-100 INSULIN) 100 unit/mL (3 mL) inpn 22 Units by SubCUTAneous route nightly. 15 mL 3    furosemide (LASIX) 80 mg tablet Take 1 Tab by mouth daily. 30 Tab 2    atorvastatin (LIPITOR) 80 mg tablet Take 1 Tab by mouth nightly. 30 Tab 2    lisinopril (PRINIVIL, ZESTRIL) 10 mg tablet Take 1 Tab by mouth daily. 30 Tab 2    carvedilol (COREG) 12.5 mg tablet Take 1 Tab by mouth two (2) times daily (with meals). 60 Tab 2    amLODIPine (NORVASC) 10 mg tablet Take 1 Tab by mouth daily. 30 Tab 2    exenatide microspheres (BYDUREON) 2 mg serr 2 mg by SubCUTAneous route every seven (7) days. 4 Each 2    glucagon (GLUCAGEN) 1 mg injection 0.5 mg by IntraVENous route once. Allergies   Allergen Reactions    Penicillins Unknown (comments)     Had reaction as  a baby     Past Surgical History:   Procedure Laterality Date    HX ORTHOPAEDIC      L wrist surgery     Family History   Problem Relation Age of Onset    COPD Mother     Arthritis Mother     Hypertension Mother     Diabetes Father       Lab Results   Component Value Date/Time    Cholesterol, total 204 (H) 07/23/2019 11:38 AM    HDL Cholesterol 36 (L) 07/23/2019 11:38 AM    LDL, calculated 116 (H) 07/23/2019 11:38 AM    VLDL, calculated 52 (H) 07/23/2019 11:38 AM    Triglyceride 259 (H) 07/23/2019 11:38 AM     Lab Results   Component Value Date/Time    Sodium 140 08/28/2019 08:58 AM    Potassium 5.5 (H) 08/28/2019 08:58 AM    Chloride 106 08/28/2019 08:58 AM    CO2 22 08/28/2019 08:58 AM    Glucose 146 (H) 08/28/2019 08:58 AM    BUN 54 (H) 08/28/2019 08:58 AM    Creatinine 2.00 (H) 08/28/2019 08:58 AM    BUN/Creatinine ratio 27 (H) 08/28/2019 08:58 AM    GFR est AA 45 (L) 08/28/2019 08:58 AM    GFR est non-AA 39 (L) 08/28/2019 08:58 AM    Calcium 8.0 (L) 08/28/2019 08:58 AM    Bilirubin, total <0.2 07/23/2019 11:38 AM    AST (SGOT) 21 07/23/2019 11:38 AM    Alk.  phosphatase 112 07/23/2019 11:38 AM    Protein, total 6.4 07/23/2019 11:38 AM    Albumin 3.4 (L) 07/23/2019 11:38 AM    A-G Ratio 1.1 (L) 07/23/2019 11:38 AM    ALT (SGPT) 19 07/23/2019 11:38 AM     Lab Results   Component Value Date/Time    WBC 8.2 07/23/2019 11:38 AM    HGB 13.4 07/23/2019 11:38 AM    HCT 42.3 07/23/2019 11:38 AM    PLATELET 212 73/54/4938 11:38 AM    MCV 84 07/23/2019 11:38 AM     Lab Results   Component Value Date/Time    Hemoglobin A1c 11.7 (H) 07/23/2019 11:38 AM     Lab Results   Component Value Date/Time    Microalb/Creat ratio (ug/mg creat.) 3,496.3 (H) 07/23/2019 11:38 AM     Wt Readings from Last 3 Encounters:   08/20/19 296 lb (134.3 kg)   07/23/19 301 lb (136.5 kg)     Last Point of Care HGB A1C  No results found for: [id number]   BP Readings from Last 3 Encounters:   08/20/19 149/68   07/23/19 160/76       Results for orders placed or performed in visit on 80/23/97   METABOLIC PANEL, BASIC   Result Value Ref Range    Glucose 146 (H) 65 - 99 mg/dL    BUN 54 (H) 6 - 24 mg/dL    Creatinine 2.00 (H) 0.76 - 1.27 mg/dL    GFR est non-AA 39 (L) >59 mL/min/1.73    GFR est AA 45 (L) >59 mL/min/1.73    BUN/Creatinine ratio 27 (H) 9 - 20    Sodium 140 134 - 144 mmol/L    Potassium 5.5 (H) 3.5 - 5.2 mmol/L    Chloride 106 96 - 106 mmol/L    CO2 22 20 - 29 mmol/L    Calcium 8.0 (L) 8.7 - 10.2 mg/dL         Last Diabetic Foot Exam on: 7/23/19      Objective:  Visit Vitals  /76 (BP 1 Location: Left arm, BP Patient Position: Sitting)   Pulse 74   Temp 98.4 °F (36.9 °C) (Oral)   Resp 17   Ht 5' 9\" (1.753 m)   Wt 293 lb (132.9 kg)   SpO2 98%   BMI 43.27 kg/m²     Awake and alert in no acute distress   Neck supple without lymphadenopathy, no carotid artery bruits auscultated bilaterally. No thyromegaly   Lungs clear throughout   S1 S2 RRR without ectopy or murmur auscultated. Extremities: no clubbing, cyanosis, trace peripheral edema   Abdomen - soft, nontender, nondistended, no masses or organomegaly  Integumentary: no rashes   Reviewed vital signs         Assessment/Plan:    ICD-10-CM ICD-9-CM    1. Type 2 diabetes mellitus with hyperglycemia, with long-term current use of insulin (HCC) E11.65 250.00     Z79.4 790.29      V58.67    2.  SOB (shortness of breath) on exertion R06.02 786.05 XR CHEST PA LAT      NT-PRO BNP   3. Morbid obesity with BMI of 40.0-44.9, adult (HCC) E66.01 278.01     Z68.41 V85.41    4. Essential hypertension I10 401.9    5. Initial Medicare annual wellness visit Z00.00 V70.0      Orders Placed This Encounter    XR CHEST PA LAT    NT-PRO BNP       alarm signs when to seek emergent care provided and reviewed   Informed to go to [facility] today to have chest xray and labs drawn due to sob  Also reinforced humalog sliding scale and when to take medication. Provided information to help with additional transportation assistance  Issues reviewed with him: low cholesterol diet, weight control and daily exercise discussed and long term diabetic complications discussed   I have discussed the diagnosis with the patient and the intended plan as seen in the above orders. The patient has received an after-visit summary and questions were answered concerning future plans. I have discussed medication side effects and warnings with the patient as well. Patient agreeable with above plan and verbalizes understanding. Follow-up and Dispositions    · Return in about 4 weeks (around 10/2/2019) for DM.

## 2019-09-04 NOTE — PROGRESS NOTES
This is an Initial Medicare Annual Wellness Exam (AWV) (Performed 12 months after IPPE or effective date of Medicare Part B enrollment, Once in a lifetime)    I have reviewed the patient's medical history in detail and updated the computerized patient record. History     Past Medical History:   Diagnosis Date    Chronic kidney disease     Chronic lower back pain     Diabetes type 2, uncontrolled ([facility]     Hypercholesterolemia     Hypertension     NATHALIA on CPAP     Peripheral edema     Pulmonary blastomycosis ([facility] [address] [facility]     Vasculogenic erectile dysfunction       Past Surgical History:   Procedure Laterality Date    HX ORTHOPAEDIC      L wrist surgery     Current Outpatient Medications   Medication Sig Dispense Refill    flash glucose scanning reader (FREESTYLE MERI 14 DAY READER) misc To use with meri sensor. Check glucose 3 times a day 1 Each 1    flash glucose sensor (FREESTYLE MERI 14 DAY SENSOR) kit Check glucose 3 times a day 1 Kit 5    insulin lispro (HUMALOG KWIKPEN INSULIN) 100 unit/mL kwikpen INJECT BENEATH THE SKIN. USE PER SLIDING SCALE ATTACHED TO CONTAINER. SCALE INDICATES 0-28 UNITS. 45 mL 2    OTHER Indications: black seed oil      OTHER Indications: colon cleanse      insulin glargine (LANTUS SOLOSTAR U-100 INSULIN) 100 unit/mL (3 mL) inpn 22 Units by SubCUTAneous route nightly. 15 mL 3    furosemide (LASIX) 80 mg tablet Take 1 Tab by mouth daily. 30 Tab 2    atorvastatin (LIPITOR) 80 mg tablet Take 1 Tab by mouth nightly. 30 Tab 2    lisinopril (PRINIVIL, ZESTRIL) 10 mg tablet Take 1 Tab by mouth daily. 30 Tab 2    carvedilol (COREG) 12.5 mg tablet Take 1 Tab by mouth two (2) times daily (with meals). 60 Tab 2    amLODIPine (NORVASC) 10 mg tablet Take 1 Tab by mouth daily. 30 Tab 2    exenatide microspheres (BYDUREON) 2 mg serr 2 mg by SubCUTAneous route every seven (7) days. 4 Each 2    glucagon (GLUCAGEN) 1 mg injection 0.5 mg by IntraVENous route once.        Allergies Allergen Reactions    Penicillins Unknown (comments)     Had reaction as  a baby     Family History   Problem Relation Age of Onset   Joyce COPD Mother    Joyce Arthritis Mother     Hypertension Mother     Diabetes Father      Social History     Tobacco Use    Smoking status: Never Smoker    Smokeless tobacco: Never Used   Substance Use Topics    Alcohol use: Yes     Comment: occasionally     Patient Active Problem List   Diagnosis Code    Essential hypertension I10    Hx of herpes genitalis Z86.19    Lung mass R91.8    Leg swelling M79.89    Morbid obesity ([facility] E66.01    NATHALIA (obstructive sleep apnea) G47.33    Pulmonary blastomycosis ([facility] [address] B40.2    Vasculogenic erectile dysfunction N52.9       Depression Risk Factor Screening:     3 most recent PHQ Screens 7/23/2019   Little interest or pleasure in doing things Not at all   Feeling down, depressed, irritable, or hopeless Not at all   Total Score PHQ 2 0     Alcohol Risk Factor Screening: You do not drink alcohol or very rarely. Functional Ability and Level of Safety:     Hearing Loss  Hearing is good. Activities of Daily Living  The home contains: no safety equipment. Patient does total self care    Fall Risk  No flowsheet data found. Abuse Screen  Patient is not abused    Cognitive Screening   Evaluation of Cognitive Function:  Has your family/caregiver stated any concerns about your memory: no  Normal    Patient Care Team   Patient Care Team:  Blaine Ojeda NP as PCP - General (Nurse Practitioner)    Assessment/Plan   Education and counseling provided:  Are appropriate based on today's review and evaluation    Diagnoses and all orders for this visit:    1. Type 2 diabetes mellitus with hyperglycemia, with long-term current use of insulin ([facility] [facility]    2. SOB (shortness of breath) on exertion  -     XR CHEST PA LAT; Future  -     NT-PRO BNP; Future    3. Morbid obesity with BMI of 40.0-44.9, adult ([facility] [address]    4. Essential hypertension    5.  Initial Medicare annual wellness visit        Health Maintenance Due   Topic Date Due    EYE EXAM RETINAL OR DILATED  02/19/1982    Pneumococcal 0-64 years (2 of 3 - PPSV23) 08/31/2016    MEDICARE YEARLY EXAM  07/23/2019    Influenza Age 9 to Adult  08/01/2019

## 2019-09-04 NOTE — PATIENT INSTRUCTIONS
When you need a ride, [employer] can get you there. [employer] is the transportation brand of [facility], providing accessible transportation options in [address] [facility] and [place] [address]. [employer] offers fixed routes, medical transportation, paratransit, and on-demand response transit. Our experienced drivers will be happy to drive you to medical and therapy appointments, local wellness and senior centers, adult day care centers, shopping, and more. [facility] partners with other local organizations that provide transportation as well  so if we cant get you there ourselves, well coordinate your needs with another service provider. Medical Rides (For Seniors 60+ and Individuals with Disabilities)  If you need a ride to a doctor or other medical transportation, please call ahead to schedule. In [facility], and [facility] - call [phone]   In [facility], [place] [address], and surrounding [address] - call [phone]   Office hours are Monday-Friday, 8 a.m. - 3 p.m. Rides are qualified based on customers age, Medicaid recipient or not, and other criteria. A voluntary contribution of $4 is suggested for non-Medicaid qualified riders. Medicare Wellness Visit, Male    The best way to live healthy is to have a lifestyle where you eat a well-balanced diet, exercise regularly, limit alcohol use, and quit all forms of tobacco/nicotine, if applicable. Regular preventive services are another way to keep healthy. Preventive services (vaccines, screening tests, monitoring & exams) can help personalize your care plan, which helps you manage your own care. Screening tests can find health problems at the earliest stages, when they are easiest to treat.    [facility] follows the current, evidence-based guidelines published by the [place] (USPSTF) when recommending preventive services for our patients. Because we follow these guidelines, sometimes recommendations change over time as research supports it. (For example, a prostate screening blood test is no longer routinely recommended for men with no symptoms.)  Of course, you and your doctor may decide to screen more often for some diseases, based on your risk and co-morbidities (chronic disease you are already diagnosed with). Preventive services for you include:  - Medicare offers their members a free annual wellness visit, which is time for you and your primary care provider to discuss and plan for your preventive service needs. Take advantage of this benefit every year!  -All adults over age 72 should receive the recommended pneumonia vaccines. Current USPSTF guidelines recommend a series of two vaccines for the best pneumonia protection.   -All adults should have a flu vaccine yearly and an ECG. All adults age 61 and older should receive a shingles vaccine once in their lifetime.    -All adults age 38-68 who are overweight should have a diabetes screening test once every three years.   -Other screening tests & preventive services for persons with diabetes include: an eye exam to screen for diabetic retinopathy, a kidney function test, a foot exam, and stricter control over your cholesterol.   -Cardiovascular screening for adults with routine risk involves an electrocardiogram (ECG) at intervals determined by the provider.   -Colorectal cancer screening should be done for adults age 54-65 with no increased risk factors for colorectal cancer. There are a number of acceptable methods of screening for this type of cancer. Each test has its own benefits and drawbacks. Discuss with your provider what is most appropriate for you during your annual wellness visit.  The different tests include: colonoscopy (considered the best screening method), a fecal occult blood test, a fecal DNA test, and sigmoidoscopy.  -All adults born between [birth year] and 1965 should be screened once for Hepatitis C.  -An Abdominal Aortic Aneurysm (AAA) Screening is recommended for men age 73-68 who has ever smoked in their lifetime.      Here is a list of your current Health Maintenance items (your personalized list of preventive services) with a due date:  Health Maintenance Due   Topic Date Due    Eye Exam  02/19/1982    Pneumococcal Vaccine (2 of 3 - PPSV23) 08/31/2016    Annual Well Visit  07/23/2019    Flu Vaccine  08/01/2019

## 2019-09-04 NOTE — PROGRESS NOTES
Lisbeth Bay presents today for   Chief Complaint   Patient presents with    Follow-up       Is someone accompanying this pt? no    Is the patient using any DME equipment during OV? no    Depression Screening:  3 most recent PHQ Screens 7/23/2019   Little interest or pleasure in doing things Not at all   Feeling down, depressed, irritable, or hopeless Not at all   Total Score PHQ 2 0       Learning Assessment:  Learning Assessment 7/23/2019   PRIMARY LEARNER Patient   HIGHEST LEVEL OF EDUCATION - PRIMARY LEARNER  GRADUATED HIGH SCHOOL OR GED   BARRIERS PRIMARY LEARNER NONE   CO-LEARNER CAREGIVER No   PRIMARY LANGUAGE ENGLISH   LEARNER PREFERENCE PRIMARY DEMONSTRATION   ANSWERED BY self   RELATIONSHIP SELF       Abuse Screening:  No flowsheet data found. Fall Risk  No flowsheet data found. Health Maintenance reviewed and discussed and ordered per Provider. Health Maintenance Due   Topic Date Due    EYE EXAM RETINAL OR DILATED  02/19/1982    Pneumococcal 0-64 years (2 of 3 - PPSV23) 08/31/2016    MEDICARE YEARLY EXAM  07/23/2019    Influenza Age 9 to Adult  08/01/2019           Coordination of Care:  1. Have you been to the ER, urgent care clinic since your last visit? Hospitalized since your last visit? no    2. Have you seen or consulted any other health care providers outside of the [address] [place] [address] since your last visit? Include any pap smears or colon screening.  no

## 2019-09-11 NOTE — PATIENT INSTRUCTIONS
Echo   Follow up 4 months  If you have not heard from the central scheduler to schedule your testing in 48 hours, please call [phone].

## 2019-09-11 NOTE — PROGRESS NOTES
HISTORY OF PRESENT ILLNESS  Volodymyr Mari is a 54 y.o. male. HPI     Patient presents for a new office visit. He has a past medical history of what sounds like nonischemic cardiomyopathy, details unknown. He has history of long-standing hypertension, diabetes mellitus, structured sleep apnea, and dyslipidemia. He recently moved to the area from [facility]. He reports undergoing extensive cardiac testing several years ago, for which he was told he had a weak heart, but no for coronary disease. He states he was started on medical therapy along with diuretics and has been doing reasonably well up until the past year or so. He developed worsening leg swelling, so his PCP increase his furosemide from 40 mg to 80 mg daily and this did improve his swelling, however, he feels his shortness of breath has not gotten any better. He continues to be short of breath with any physical activity. Just walking across the room will leave him winded. However, he has not noted any orthopnea or PND. He does notice some intermittent chest pain especially on the left side when he lays down at night. He denies any heart palpitations, dizziness, nor syncope. Past Medical History:   Diagnosis Date    Chronic kidney disease     Chronic lower back pain     Diabetes type 2, uncontrolled ([address]     Hypercholesterolemia     Hypertension     NATHALIA on CPAP     Peripheral edema     [address]monary blastomycosis [address] [address]     Vasculogenic erectile dysfunction      Current Outpatient Medications   Medication Sig Dispense Refill    flash glucose scanning reader (FREESTYLE MERI 14 DAY READER) misc To use with meri sensor. Check glucose 3 times a day 1 Each 1    flash glucose sensor (FREESTYLE MERI 14 DAY SENSOR) kit Check glucose 3 times a day 1 Kit 5    insulin lispro (HUMALOG KWIKPEN INSULIN) 100 unit/mL kwikpen INJECT BENEATH THE SKIN. USE PER SLIDING SCALE ATTACHED TO CONTAINER. SCALE INDICATES 0-28 UNITS.  45 mL 2    OTHER Indications: black seed oil      OTHER Indications: colon cleanse      insulin glargine (LANTUS SOLOSTAR U-100 INSULIN) 100 unit/mL (3 mL) inpn 22 Units by SubCUTAneous route nightly. 15 mL 3    furosemide (LASIX) 80 mg tablet Take 1 Tab by mouth daily. 30 Tab 2    atorvastatin (LIPITOR) 80 mg tablet Take 1 Tab by mouth nightly. 30 Tab 2    lisinopril (PRINIVIL, ZESTRIL) 10 mg tablet Take 1 Tab by mouth daily. 30 Tab 2    carvedilol (COREG) 12.5 mg tablet Take 1 Tab by mouth two (2) times daily (with meals). 60 Tab 2    amLODIPine (NORVASC) 10 mg tablet Take 1 Tab by mouth daily. 30 Tab 2    exenatide microspheres (BYDUREON) 2 mg serr 2 mg by SubCUTAneous route every seven (7) days. 4 Each 2    glucagon (GLUCAGEN) 1 mg injection 0.5 mg by IntraVENous route once. Allergies   Allergen Reactions    Penicillins Unknown (comments)     Had reaction as  a baby      Social History     Tobacco Use    Smoking status: Never Smoker    Smokeless tobacco: Never Used   Substance Use Topics    Alcohol use: Yes     Comment: occasionally    Drug use: Never     Family History   Problem Relation Age of Onset   [employer] COPD Mother     Arthritis Mother     Hypertension Mother     Diabetes Father          Review of Systems   Constitutional: Negative for chills, fever and weight loss. HENT: Negative for nosebleeds. Eyes: Negative for blurred vision and double vision. Respiratory: Positive for shortness of breath. Negative for cough and wheezing. Cardiovascular: Positive for leg swelling. Negative for chest pain, palpitations, orthopnea, claudication and PND. Gastrointestinal: Negative for abdominal pain, heartburn, nausea and vomiting. Genitourinary: Negative for dysuria and hematuria. Musculoskeletal: Negative for falls and myalgias. Skin: Negative for rash. Neurological: Negative for dizziness, focal weakness and headaches. Endo/Heme/Allergies: Does not bruise/bleed easily.    Psychiatric/Behavioral: Negative for substance abuse. Visit Vitals  /70 (BP 1 Location: Right arm, BP Patient Position: Sitting)   Pulse (!) 58   Ht 5' 9\" (1.753 m)   Wt 133.8 kg (295 lb)   SpO2 98%   BMI 43.56 kg/m²       Physical Exam   Constitutional: He is oriented to person, place, and time. He appears well-developed and well-nourished. HENT:   Head: Normocephalic and atraumatic. Eyes: Conjunctivae are normal.   Neck: Neck supple. No JVD present. Carotid bruit is not present. Cardiovascular: Normal rate, regular rhythm, S1 normal, S2 normal and normal pulses. Exam reveals distant heart sounds. Exam reveals no gallop and no S3. No murmur heard. Pulmonary/Chest: He has decreased breath sounds. He has no wheezes. He has no rales. Abdominal: Soft. Bowel sounds are normal. There is no tenderness. Musculoskeletal: He exhibits edema ( Trace bilateral lower extremity to the knees, compression stockings on). He exhibits no tenderness or deformity. Neurological: He is alert and oriented to person, place, and time. Skin: Skin is warm and dry. Psychiatric: He has a normal mood and affect. His behavior is normal. Thought content normal.     EKG: Sinus bradycardia, normal axis, normal QTc interval, nonspecific T wave abnormality, poor R wave progression, no ST segment changes concerning for ischemia. ASSESSMENT and PLAN    Chronic congestive heart failure. Likely systolic in nature based on his history and medications. He has what sounds like NYHA class III symptomatology. His volume status improved with increased dosages of furosemide, which I would continue. I have recommended an echocardiogram to reevaluate his LV function. Also like to obtain records of his prior cardiac testing from [facility]. Patient will sign a medical release. For now we will continue his current cardiac regimen. Given his recent borderline high potassium level, I would not start spironolactone. Essential hypertension. Patient's blood pressure appears to be reasonable on his current medical regimen, which I would continue. Dyslipidemia. Patient has been treated with a high-dose potent statin. I will continue this medication (followed closely by his PCP. Obstructive sleep apnea. Patient has been compliant with his CPAP most nights. He is encouraged to use as much as possible. Chronic kidney disease, stage III. His most recent creatinine was 2.0. This is followed by his PCP. Diabetes mellitus, type II. Insulin-dependent. Historically this has been uncontrolled. His most recent hemoglobin A1c was greater than 11. Reports very labile blood pressure readings with frequent episodes of hypoglycemia.   Patient may benefit from seeing a endocrinologist.    Follow-up in 4 months, sooner if needed

## 2019-09-11 NOTE — PROGRESS NOTES
Pavan Roberto presents today for   Chief Complaint   Patient presents with    New Patient     referred by PCP for sob and chest pain     Shortness of Breath     exertion    Chest Pain     intermittent discomfort, left sided, 1 month, exertion/rest       Pavan Roberto preferred language for health care discussion is english/other. Is someone accompanying this pt? no    Is the patient using any DME equipment during [address]? no    Depression Screening:  3 most recent PHQ Screens 7/23/2019   Little interest or pleasure in doing things Not at all   Feeling down, depressed, irritable, or hopeless Not at all   Total Score PHQ 2 0       Learning Assessment:  Learning Assessment 7/23/2019   PRIMARY LEARNER Patient   HIGHEST LEVEL OF EDUCATION - PRIMARY LEARNER  GRADUATED HIGH SCHOOL OR GED   BARRIERS PRIMARY LEARNER NONE   CO-LEARNER CAREGIVER No   PRIMARY LANGUAGE ENGLISH   LEARNER PREFERENCE PRIMARY DEMONSTRATION   ANSWERED BY self   RELATIONSHIP SELF       Abuse Screening:  No flowsheet data found. Fall Risk  No flowsheet data found. Pt currently taking Anticoagulant therapy? no    Coordination of Care:  1. Have you been to the ER, urgent care clinic since your last visit? Hospitalized since your last visit? no    2. Have you seen or consulted any other health care providers outside of the [address] [place] [address] since your last visit? Include any pap smears or colon screening.  no

## 2019-09-12 NOTE — TELEPHONE ENCOUNTER
As on-call provider, received call from this patient via the answering service. He verbalized that he was concerned because [place] couple of days ago\" he had a \"diabetic episode\" and had a fall at home. Said that when he fell he landed on the footboard of his bed which went between his buttocks. He said that now his buttocks are \"all swollen together\" and he developed a \"blister\" at the site. Said that he popped the blister and it has been draining clear fluid. Said that he is \"pretty sore\". Asking if this was serious enough to go to the emergency room. Discussed with patient that it was not possible to determine the extent of his injuries over the telephone, and also concerning to hear that his blood glucose dropped so low that he had a fall and injured himself. Upon chart review noted that his most recent A1c is 11.7 and discussed with him that having an uncontrolled blood sugar can impede his body's ability to heal.  Also noted in recent documentation that this is not his first fall at home. Informed him that since he seems to be having difficulty controlling his blood sugar and is at risk for infection and sepsis, as well as falls, it would be wise for him to go to the emergency room at this time for further evaluation. Patient verbalized that he will go to the emergency room this evening. He was also instructed to follow-up with his PCP after the emergency room visit.   Patient verbalized understanding and had no questions

## 2019-09-16 NOTE — TELEPHONE ENCOUNTER
Officer Natalia Nurse with [employer] calling to inform provider of patients passing.

## 2019-09-16 NOTE — TELEPHONE ENCOUNTER
During chart review for diabetes registry it was noted that the patient placed a call to the on call provider 9/12/19 due to a \"diabetic episode\" and fall. Patient was advised to report to the ED and follow up with PCP. It it unknown if patient presented to the ED and no appointment has been scheduled with PCP. Placed call to patient and reached voicemail. Left a non-detailed message requesting patient return call to the office.

## 2019-09-17 NOTE — TELEPHONE ENCOUNTER
Eddy Le  with [facility]:    Asking if NP Augustine Love is aware of pt's death, which was 2019 at or about 12:25PM    Asking if she will sign the death certificate?     Said in talking with officer Lakesha Barbosa death appears to be of natural causes/no police suspicions

## 2019-09-17 NOTE — TELEPHONE ENCOUNTER
[facility] returned call and was given message [facility] will sign the death certificate. They will be bringing to the office in a day or so.